# Patient Record
Sex: MALE | Race: WHITE | NOT HISPANIC OR LATINO | Employment: UNEMPLOYED | URBAN - METROPOLITAN AREA
[De-identification: names, ages, dates, MRNs, and addresses within clinical notes are randomized per-mention and may not be internally consistent; named-entity substitution may affect disease eponyms.]

---

## 2018-04-16 ENCOUNTER — OFFICE VISIT (OUTPATIENT)
Dept: FAMILY MEDICINE | Facility: CLINIC | Age: 23
End: 2018-04-16
Payer: COMMERCIAL

## 2018-04-16 VITALS
SYSTOLIC BLOOD PRESSURE: 113 MMHG | WEIGHT: 170 LBS | RESPIRATION RATE: 18 BRPM | HEART RATE: 83 BPM | BODY MASS INDEX: 24.34 KG/M2 | OXYGEN SATURATION: 98 % | HEIGHT: 70 IN | TEMPERATURE: 98 F | DIASTOLIC BLOOD PRESSURE: 63 MMHG

## 2018-04-16 DIAGNOSIS — F90.0 ATTENTION DEFICIT HYPERACTIVITY DISORDER (ADHD), PREDOMINANTLY INATTENTIVE TYPE: ICD-10-CM

## 2018-04-16 DIAGNOSIS — F32.A DEPRESSIVE DISORDER: Primary | ICD-10-CM

## 2018-04-16 DIAGNOSIS — F52.21 ERECTILE DYSFUNCTION OF NONORGANIC ORIGIN: ICD-10-CM

## 2018-04-16 PROCEDURE — 99213 OFFICE O/P EST LOW 20 MIN: CPT | Mod: S$GLB,,, | Performed by: FAMILY MEDICINE

## 2018-04-16 RX ORDER — SILDENAFIL 100 MG/1
100 TABLET, FILM COATED ORAL DAILY PRN
Qty: 10 TABLET | Refills: 3 | Status: SHIPPED | OUTPATIENT
Start: 2018-04-16 | End: 2019-03-29

## 2018-04-16 RX ORDER — SILDENAFIL 25 MG/1
TABLET, FILM COATED ORAL
COMMUNITY
End: 2018-04-16

## 2018-04-16 RX ORDER — DEXTROAMPHETAMINE SACCHARATE, AMPHETAMINE ASPARTATE, DEXTROAMPHETAMINE SULFATE AND AMPHETAMINE SULFATE 7.5; 7.5; 7.5; 7.5 MG/1; MG/1; MG/1; MG/1
30 TABLET ORAL DAILY
COMMUNITY
End: 2018-04-16 | Stop reason: SDUPTHER

## 2018-04-16 RX ORDER — DEXTROAMPHETAMINE SACCHARATE, AMPHETAMINE ASPARTATE, DEXTROAMPHETAMINE SULFATE AND AMPHETAMINE SULFATE 7.5; 7.5; 7.5; 7.5 MG/1; MG/1; MG/1; MG/1
30 TABLET ORAL DAILY
Qty: 30 TABLET | Refills: 0 | Status: SHIPPED | OUTPATIENT
Start: 2018-04-16 | End: 2018-06-06 | Stop reason: SDUPTHER

## 2018-04-16 NOTE — PROGRESS NOTES
Subjective:       Patient ID: Eric Rosa is a 22 y.o. male.    Chief Complaint: No chief complaint on file.    School Performance: no issue; child is learning; Did not tolerate wellbutrin. The trintillix seems to work better than the prozac, but we used a small dose of prozac. On trintillix he had a single episode of syncope. It lasted only about 3 seconds. His family reported that he was shaking like he had a seizure.  School is going well.   School Support: well supported; teachers are not involved  Organization: good organization   Appetite: normal appetite; no binge eating   Mood: stable   Sleep: poor sleep; lack of adequate sleep, tired at school  Friends: well connected with peers   Family: major problems  Self Esteem: low   Attention: focus is imporved, but could be better.   Hyperactivity: is not hyperactive   Impulsivity: impulsive  Tasking: able to initiate tasks; able to complete tasks; able to move on to the next task; multi-tasking; procrastinates    Erectile dysfunction. Difficulty getting an erection. Libido is normal. No changes is testicles. No morning erections. Unable to achieve penetration. No pain with erections and no change in testicles Testosterone is normal. Since he is so young I recommended that he see urology. He would like to try viagra first instead. He feels strongly that this is a mental issue not a physical one.  The 25mg of viagra didn't help so he took 4 of them and that did help.  We discussed having him see a counselor, but he refuses.       Review of Systems   Constitutional: Negative for activity change, appetite change, chills and fatigue.   HENT: Negative for congestion, ear discharge, hearing loss, mouth sores, rhinorrhea, sinus pain and trouble swallowing.    Eyes: Negative for photophobia, pain, discharge and visual disturbance.   Respiratory: Negative for cough, chest tightness, shortness of breath and wheezing.    Cardiovascular: Negative for chest pain, palpitations  and leg swelling.   Gastrointestinal: Negative for abdominal distention, abdominal pain, blood in stool, constipation, diarrhea, nausea and vomiting.   Genitourinary: Negative for difficulty urinating, dysuria and flank pain.   Musculoskeletal: Negative for arthralgias, back pain, gait problem, joint swelling and myalgias.   Skin: Negative for color change and rash.   Neurological: Negative for dizziness, tremors, speech difficulty, light-headedness, numbness and headaches.   Psychiatric/Behavioral: Negative for behavioral problems, confusion, hallucinations, sleep disturbance and suicidal ideas.       Objective:      Physical Exam   Constitutional: He appears well-developed.   HENT:   Head: Normocephalic.   Mouth/Throat: Oropharynx is clear and moist.   Eyes: Conjunctivae and EOM are normal. Pupils are equal, round, and reactive to light.   Neck: Normal range of motion. Neck supple.   Cardiovascular: Normal rate, regular rhythm and normal heart sounds.    Pulmonary/Chest: Effort normal and breath sounds normal.   Abdominal: Soft. Bowel sounds are normal.   Musculoskeletal: Normal range of motion.   Neurological: He is alert.   Skin: Skin is warm. Capillary refill takes less than 2 seconds.   Psychiatric: He has a normal mood and affect. Judgment and thought content normal.   Nursing note and vitals reviewed.      Assessment:       1. Depressive disorder    2. Attention deficit hyperactivity disorder (ADHD), predominantly inattentive type        Plan:   Depressive disorder    Attention deficit hyperactivity disorder (ADHD), predominantly inattentive type  -     dextroamphetamine-amphetamine 30 mg Tab; Take 30 mg by mouth once daily.  Dispense: 30 tablet; Refill: 0    Erectile dysfunction of nonorganic origin  -     sildenafil (VIAGRA) 100 MG tablet; Take 1 tablet (100 mg total) by mouth daily as needed for Erectile Dysfunction.  Dispense: 10 tablet; Refill: 3

## 2018-06-06 ENCOUNTER — TELEPHONE (OUTPATIENT)
Dept: FAMILY MEDICINE | Facility: CLINIC | Age: 23
End: 2018-06-06

## 2018-06-06 DIAGNOSIS — F90.0 ATTENTION DEFICIT HYPERACTIVITY DISORDER (ADHD), PREDOMINANTLY INATTENTIVE TYPE: ICD-10-CM

## 2018-06-06 RX ORDER — DEXTROAMPHETAMINE SACCHARATE, AMPHETAMINE ASPARTATE, DEXTROAMPHETAMINE SULFATE AND AMPHETAMINE SULFATE 7.5; 7.5; 7.5; 7.5 MG/1; MG/1; MG/1; MG/1
30 TABLET ORAL DAILY
Qty: 30 TABLET | Refills: 0 | Status: SHIPPED | OUTPATIENT
Start: 2018-06-06 | End: 2018-06-22 | Stop reason: SDUPTHER

## 2018-06-06 NOTE — TELEPHONE ENCOUNTER
----- Message from Coby Jane sent at 6/6/2018 12:40 PM CDT -----  Contact: Citlali Abebe (Mother)  Citlali Abebe (Mother) calling to request a new prescription for dextroamphetamine-amphetamine 30 mg Tab. Please advise.   Call back    Thanks!

## 2018-06-06 NOTE — TELEPHONE ENCOUNTER
Refill request for dextroamphetamine-amphetamine 30 mg Tab. Last visit 4/16/18.  is consistent with last refill 4/23/18.

## 2018-06-22 DIAGNOSIS — F90.0 ATTENTION DEFICIT HYPERACTIVITY DISORDER (ADHD), PREDOMINANTLY INATTENTIVE TYPE: ICD-10-CM

## 2018-06-22 RX ORDER — DEXTROAMPHETAMINE SACCHARATE, AMPHETAMINE ASPARTATE, DEXTROAMPHETAMINE SULFATE AND AMPHETAMINE SULFATE 7.5; 7.5; 7.5; 7.5 MG/1; MG/1; MG/1; MG/1
30 TABLET ORAL DAILY
Qty: 30 TABLET | Refills: 0 | Status: SHIPPED | OUTPATIENT
Start: 2018-06-22 | End: 2019-03-29 | Stop reason: SDUPTHER

## 2018-06-22 NOTE — TELEPHONE ENCOUNTER
----- Message from Yolie Bocanegra sent at 6/22/2018 11:47 AM CDT -----  Type:  RX Refill Request    Who Called:  Mother-Citlali Abebe   Refill or New Rx:  Refill   RX Name and Strength:  Adderall 30 mg   How is the patient currently taking it? (ex. 1XDay):  1 x day  Is this a 30 day or 90 day RX:  30 day supply  Preferred Pharmacy with phone number: will pickup rx  Local or Mail Order:    Ordering Provider:  Dr Arrieta [facility] Call Back Number:  [phone]  Additional Information:

## 2019-03-29 ENCOUNTER — OFFICE VISIT (OUTPATIENT)
Dept: FAMILY MEDICINE | Facility: CLINIC | Age: 24
End: 2019-03-29

## 2019-03-29 VITALS
DIASTOLIC BLOOD PRESSURE: 67 MMHG | BODY MASS INDEX: 26.63 KG/M2 | OXYGEN SATURATION: 97 % | RESPIRATION RATE: 18 BRPM | SYSTOLIC BLOOD PRESSURE: 115 MMHG | WEIGHT: 186 LBS | HEART RATE: 66 BPM | HEIGHT: 70 IN

## 2019-03-29 DIAGNOSIS — F90.0 ATTENTION DEFICIT HYPERACTIVITY DISORDER (ADHD), PREDOMINANTLY INATTENTIVE TYPE: ICD-10-CM

## 2019-03-29 PROCEDURE — 99213 OFFICE O/P EST LOW 20 MIN: CPT | Mod: PBBFAC,PN | Performed by: FAMILY MEDICINE

## 2019-03-29 PROCEDURE — 99213 PR OFFICE/OUTPT VISIT, EST, LEVL III, 20-29 MIN: ICD-10-PCS | Mod: S$PBB,,, | Performed by: FAMILY MEDICINE

## 2019-03-29 PROCEDURE — 99999 PR PBB SHADOW E&M-EST. PATIENT-LVL III: CPT | Mod: PBBFAC,,, | Performed by: FAMILY MEDICINE

## 2019-03-29 PROCEDURE — 99213 OFFICE O/P EST LOW 20 MIN: CPT | Mod: S$PBB,,, | Performed by: FAMILY MEDICINE

## 2019-03-29 PROCEDURE — 99999 PR PBB SHADOW E&M-EST. PATIENT-LVL III: ICD-10-PCS | Mod: PBBFAC,,, | Performed by: FAMILY MEDICINE

## 2019-03-29 RX ORDER — DEXTROAMPHETAMINE SACCHARATE, AMPHETAMINE ASPARTATE, DEXTROAMPHETAMINE SULFATE AND AMPHETAMINE SULFATE 7.5; 7.5; 7.5; 7.5 MG/1; MG/1; MG/1; MG/1
30 TABLET ORAL DAILY
Qty: 30 TABLET | Refills: 0 | Status: SHIPPED | OUTPATIENT
Start: 2019-03-29 | End: 2019-05-22 | Stop reason: SDUPTHER

## 2019-03-29 NOTE — PROGRESS NOTES
"Subjective:       Patient ID: Eirc Rosa is a 23 y.o. male.    Chief Complaint: Establish Care and Medication Refill    Follow up    Pt states that adhd is well controlled  No issues/side effects  Compliant with treatment regimen      Review of Systems   Constitutional: Negative for activity change, appetite change, fatigue and fever.   HENT: Negative for congestion, dental problem, ear discharge, hearing loss, postnasal drip, sinus pain, sneezing and trouble swallowing.    Eyes: Negative for photophobia and discharge.   Respiratory: Negative for apnea, cough and shortness of breath.    Cardiovascular: Negative for chest pain.   Gastrointestinal: Negative for abdominal distention, abdominal pain, blood in stool, diarrhea, nausea and vomiting.   Endocrine: Negative for cold intolerance.   Genitourinary: Negative for difficulty urinating, flank pain, frequency, hematuria and testicular pain.   Musculoskeletal: Negative for arthralgias and myalgias.   Skin: Negative for color change.   Allergic/Immunologic: Negative for environmental allergies, food allergies and immunocompromised state.   Neurological: Negative for dizziness, syncope, numbness and headaches.   Hematological: Negative for adenopathy. Does not bruise/bleed easily.   Psychiatric/Behavioral: Negative for agitation, confusion, decreased concentration, hallucinations, self-injury and sleep disturbance.   All other systems reviewed and are negative.        Reviewed family, medical, surgical, and social history.    Objective:      /67 (BP Location: Left arm, Patient Position: Sitting, BP Method: Medium (Automatic))   Pulse 66   Resp 18   Ht 5' 10" (1.778 m)   Wt 84.4 kg (186 lb)   SpO2 97%   BMI 26.69 kg/m²   Physical Exam   Constitutional: He is oriented to person, place, and time. He appears well-developed and well-nourished. No distress.   HENT:   Head: Normocephalic and atraumatic.   Nose: Nose normal.   Mouth/Throat: Oropharynx is clear " and moist. No oropharyngeal exudate.   Eyes: Pupils are equal, round, and reactive to light. Conjunctivae and EOM are normal.   Neck: Normal range of motion. No thyromegaly present.   Cardiovascular: Normal rate, regular rhythm, normal heart sounds and intact distal pulses. Exam reveals no gallop and no friction rub.   No murmur heard.  Pulmonary/Chest: Effort normal and breath sounds normal. No respiratory distress. He has no wheezes. He has no rales.   Abdominal: Soft. He exhibits no distension. There is no tenderness. There is no guarding.   Musculoskeletal: Normal range of motion. He exhibits no edema, tenderness or deformity.   Neurological: He is alert and oriented to person, place, and time. He displays normal reflexes. No cranial nerve deficit or sensory deficit. He exhibits normal muscle tone. Coordination normal.   Skin: Skin is warm and dry. No rash noted. He is not diaphoretic. No erythema. No pallor.   Psychiatric: He has a normal mood and affect. His behavior is normal. Judgment and thought content normal.   Nursing note and vitals reviewed.      Assessment:       1. Attention deficit hyperactivity disorder (ADHD), predominantly inattentive type        Plan:       Attention deficit hyperactivity disorder (ADHD), predominantly inattentive type  -     dextroamphetamine-amphetamine 30 mg Tab; Take 30 mg by mouth once daily.  Dispense: 30 tablet; Refill: 0            Risks, benefits, and side effects were discussed with the patient. All questions were answered to the fullest satisfaction of the patient, and pt verbalized understanding and agreement to treatment plan. Pt was to call with any new or worsening symptoms, or present to the ER.

## 2019-05-22 DIAGNOSIS — F90.0 ATTENTION DEFICIT HYPERACTIVITY DISORDER (ADHD), PREDOMINANTLY INATTENTIVE TYPE: ICD-10-CM

## 2019-05-22 RX ORDER — DEXTROAMPHETAMINE SACCHARATE, AMPHETAMINE ASPARTATE, DEXTROAMPHETAMINE SULFATE AND AMPHETAMINE SULFATE 7.5; 7.5; 7.5; 7.5 MG/1; MG/1; MG/1; MG/1
30 TABLET ORAL DAILY
Qty: 30 TABLET | Refills: 0 | Status: SHIPPED | OUTPATIENT
Start: 2019-05-22 | End: 2019-06-27 | Stop reason: SDUPTHER

## 2019-05-22 NOTE — TELEPHONE ENCOUNTER
----- Message from Svitlana Schneider sent at 5/22/2019 12:35 PM CDT -----  Contact: mother Citlali Abebe  Patient need to speak to nurse regarding patient need refill on adderall per mother     [id number] - [place], [place] - [address]  [address] [place] [address]  Phone: [phone] Fax: [phone]    Please call [phone] (home)

## 2019-06-27 DIAGNOSIS — F90.0 ATTENTION DEFICIT HYPERACTIVITY DISORDER (ADHD), PREDOMINANTLY INATTENTIVE TYPE: ICD-10-CM

## 2019-06-27 RX ORDER — DEXTROAMPHETAMINE SACCHARATE, AMPHETAMINE ASPARTATE, DEXTROAMPHETAMINE SULFATE AND AMPHETAMINE SULFATE 7.5; 7.5; 7.5; 7.5 MG/1; MG/1; MG/1; MG/1
30 TABLET ORAL DAILY
Qty: 30 TABLET | Refills: 0 | Status: SHIPPED | OUTPATIENT
Start: 2019-06-27 | End: 2021-11-03 | Stop reason: SDUPTHER

## 2019-06-27 NOTE — TELEPHONE ENCOUNTER
----- Message from Keisha Watts sent at 6/27/2019 11:16 AM CDT -----  Contact: Citlali  Type:  RX Refill Request    Who Called:  Mother  Refill or New Rx:  Refill  RX Name and Strength:  Adderall  How is the patient currently taking it? (ex. 1XDay):    Is this a 30 day or 90 day RX:  30  Preferred Pharmacy with phone number:    Local or Mail Order: Local  Ordering Provider:  Yazan Tafoya Call Back Number:  [phone]  Additional Information:  Calling for a refill to be sent for Adderall

## 2021-03-16 ENCOUNTER — CLINICAL SUPPORT (OUTPATIENT)
Dept: FAMILY MEDICINE | Facility: CLINIC | Age: 26
End: 2021-03-16

## 2021-03-16 VITALS
HEART RATE: 76 BPM | DIASTOLIC BLOOD PRESSURE: 82 MMHG | SYSTOLIC BLOOD PRESSURE: 124 MMHG | RESPIRATION RATE: 15 BRPM | TEMPERATURE: 98 F | OXYGEN SATURATION: 98 % | BODY MASS INDEX: 31.21 KG/M2 | HEIGHT: 70 IN | WEIGHT: 218 LBS

## 2021-03-16 DIAGNOSIS — Z02.89 ENCOUNTER FOR OCCUPATIONAL HISTORY AND PHYSICAL EXAMINATION: Primary | ICD-10-CM

## 2021-03-16 PROCEDURE — 99499 UNLISTED E&M SERVICE: CPT | Mod: S$GLB,,, | Performed by: NURSE PRACTITIONER

## 2021-03-16 PROCEDURE — 99499 PR PHYSICAL - BASIC NON DOT/CDL: ICD-10-PCS | Mod: S$GLB,,, | Performed by: NURSE PRACTITIONER

## 2021-03-17 DIAGNOSIS — Z11.59 NEED FOR HEPATITIS C SCREENING TEST: ICD-10-CM

## 2021-11-03 ENCOUNTER — OFFICE VISIT (OUTPATIENT)
Dept: FAMILY MEDICINE | Facility: CLINIC | Age: 26
End: 2021-11-03

## 2021-11-03 VITALS
OXYGEN SATURATION: 97 % | RESPIRATION RATE: 16 BRPM | DIASTOLIC BLOOD PRESSURE: 64 MMHG | WEIGHT: 233.38 LBS | SYSTOLIC BLOOD PRESSURE: 117 MMHG | HEIGHT: 70 IN | HEART RATE: 74 BPM | BODY MASS INDEX: 33.41 KG/M2

## 2021-11-03 DIAGNOSIS — F90.0 ATTENTION DEFICIT HYPERACTIVITY DISORDER (ADHD), PREDOMINANTLY INATTENTIVE TYPE: Primary | ICD-10-CM

## 2021-11-03 PROCEDURE — 99999 PR PBB SHADOW E&M-EST. PATIENT-LVL III: ICD-10-PCS | Mod: PBBFAC,,, | Performed by: FAMILY MEDICINE

## 2021-11-03 PROCEDURE — 99213 OFFICE O/P EST LOW 20 MIN: CPT | Mod: PBBFAC,PN | Performed by: FAMILY MEDICINE

## 2021-11-03 PROCEDURE — 99213 OFFICE O/P EST LOW 20 MIN: CPT | Mod: S$PBB,,, | Performed by: FAMILY MEDICINE

## 2021-11-03 PROCEDURE — 99213 PR OFFICE/OUTPT VISIT, EST, LEVL III, 20-29 MIN: ICD-10-PCS | Mod: S$PBB,,, | Performed by: FAMILY MEDICINE

## 2021-11-03 PROCEDURE — 99999 PR PBB SHADOW E&M-EST. PATIENT-LVL III: CPT | Mod: PBBFAC,,, | Performed by: FAMILY MEDICINE

## 2021-11-03 RX ORDER — DEXTROAMPHETAMINE SACCHARATE, AMPHETAMINE ASPARTATE, DEXTROAMPHETAMINE SULFATE AND AMPHETAMINE SULFATE 7.5; 7.5; 7.5; 7.5 MG/1; MG/1; MG/1; MG/1
30 TABLET ORAL DAILY
Qty: 30 TABLET | Refills: 0 | Status: SHIPPED | OUTPATIENT
Start: 2021-11-03

## 2022-01-19 ENCOUNTER — TELEPHONE (OUTPATIENT)
Dept: FAMILY MEDICINE | Facility: CLINIC | Age: 27
End: 2022-01-19

## 2022-01-19 NOTE — TELEPHONE ENCOUNTER
----- Message from Cathy Loo sent at 1/19/2022 12:56 PM CST -----  Pt came by and needed to get a letter from Dr Hand clearing him from any medications for him to be able to to proceed to join the ... See forms in  from

## 2022-05-25 DIAGNOSIS — Z11.59 NEED FOR HEPATITIS C SCREENING TEST: ICD-10-CM

## 2024-10-15 NOTE — LETTER
January 19, 2022    Eric Rosa  [address] [place] [address]             [facility]  [address]  [place] [address]  Phone: [phone]  Fax: [phone] T whom it may concern:    Mr. Rosa, according to our chart, has not taken any medicine to treat ADHD for over two years and his symptoms remain well controlled despite not taking medicine.      If you have any questions or concerns, please don't hesitate to call.    Sincerely,        Gregg Hand MD      Medical Assessment Completed on: 21-Oct-2024 10:04